# Patient Record
Sex: FEMALE | Race: WHITE
[De-identification: names, ages, dates, MRNs, and addresses within clinical notes are randomized per-mention and may not be internally consistent; named-entity substitution may affect disease eponyms.]

---

## 2019-01-17 ENCOUNTER — TRANSCRIPTION ENCOUNTER (OUTPATIENT)
Age: 49
End: 2019-01-17

## 2019-06-21 ENCOUNTER — TRANSCRIPTION ENCOUNTER (OUTPATIENT)
Age: 49
End: 2019-06-21

## 2020-02-19 ENCOUNTER — TRANSCRIPTION ENCOUNTER (OUTPATIENT)
Age: 50
End: 2020-02-19

## 2022-02-04 ENCOUNTER — APPOINTMENT (OUTPATIENT)
Dept: SPINE | Facility: CLINIC | Age: 52
End: 2022-02-04
Payer: COMMERCIAL

## 2022-02-04 ENCOUNTER — NON-APPOINTMENT (OUTPATIENT)
Age: 52
End: 2022-02-04

## 2022-02-04 VITALS
BODY MASS INDEX: 20.12 KG/M2 | DIASTOLIC BLOOD PRESSURE: 78 MMHG | HEIGHT: 63.5 IN | HEART RATE: 84 BPM | WEIGHT: 115 LBS | SYSTOLIC BLOOD PRESSURE: 132 MMHG | OXYGEN SATURATION: 98 %

## 2022-02-04 DIAGNOSIS — R26.89 OTHER ABNORMALITIES OF GAIT AND MOBILITY: ICD-10-CM

## 2022-02-04 DIAGNOSIS — Z82.49 FAMILY HISTORY OF ISCHEMIC HEART DISEASE AND OTHER DISEASES OF THE CIRCULATORY SYSTEM: ICD-10-CM

## 2022-02-04 DIAGNOSIS — M41.9 SCOLIOSIS, UNSPECIFIED: ICD-10-CM

## 2022-02-04 DIAGNOSIS — Z77.22 CONTACT WITH AND (SUSPECTED) EXPOSURE TO ENVIRONMENTAL TOBACCO SMOKE (ACUTE) (CHRONIC): ICD-10-CM

## 2022-02-04 DIAGNOSIS — Z80.41 FAMILY HISTORY OF MALIGNANT NEOPLASM OF OVARY: ICD-10-CM

## 2022-02-04 DIAGNOSIS — Z82.3 FAMILY HISTORY OF STROKE: ICD-10-CM

## 2022-02-04 DIAGNOSIS — Z83.3 FAMILY HISTORY OF DIABETES MELLITUS: ICD-10-CM

## 2022-02-04 PROBLEM — Z00.00 ENCOUNTER FOR PREVENTIVE HEALTH EXAMINATION: Status: ACTIVE | Noted: 2022-02-04

## 2022-02-04 PROCEDURE — 99203 OFFICE O/P NEW LOW 30 MIN: CPT

## 2022-02-05 PROBLEM — R26.89 BALANCE PROBLEM: Status: ACTIVE | Noted: 2022-02-04

## 2022-02-05 PROBLEM — M41.9 SCOLIOSIS OF THORACOLUMBAR SPINE: Status: ACTIVE | Noted: 2022-02-04

## 2022-02-05 NOTE — CONSULT LETTER
[Dear  ___] : Dear  [unfilled], [Courtesy Letter:] : I had the pleasure of seeing your patient, [unfilled], in my office today. [Please see my note below.] : Please see my note below. [Referral Closing:] : Thank you very much for seeing this patient.  If you have any questions, please do not hesitate to contact me. [Sincerely,] : Sincerely, [FreeTextEntry2] : Dr. Omaira Pandey [FreeTextEntry3] : José Simeon MS, ANP-BC, SCRN\par Board Certified Adult Nurse Practitioner on Behalf of Dr. Danica Willoughby\par  Neurosurgeon\par Department Neurosurgery\par \par

## 2022-02-05 NOTE — PHYSICAL EXAM
[Oriented To Time, Place, And Person] : oriented to person, place, and time [Cranial Nerves Optic (II)] : visual acuity intact bilaterally,  pupils equal round and reactive to light [Cranial Nerves Oculomotor (III)] : extraocular motion intact [Cranial Nerves Trigeminal (V)] : facial sensation intact symmetrically [Cranial Nerves Facial (VII)] : face symmetrical [Cranial Nerves Vestibulocochlear (VIII)] : hearing was intact bilaterally [Cranial Nerves Glossopharyngeal (IX)] : tongue and palate midline [Cranial Nerves Accessory (XI - Cranial And Spinal)] : head turning and shoulder shrug symmetric [Cranial Nerves Hypoglossal (XII)] : there was no tongue deviation with protrusion [4] : L3 quadriceps 4/5 [5] : S1 toe walking 5/5 [3+] : Ankle jerk left 3+ [___] : [unfilled] ~Ubeats present on the right [___] : [unfilled] ~Ubeats present on the left [Deformity] : deformity [Normal] : normal [Able to toe walk] : the patient was able to toe walk [Able to heel walk] : the patient was able to heel walk [Outer Ear] : the ears and nose were normal in appearance [Neck Appearance] : the appearance of the neck was normal [] : no respiratory distress [Heart Rate And Rhythm] : heart rate was normal and rhythm regular [Abnormal Walk] : normal gait [Baker] : Baker's sign was not demonstrated [Straight-Leg Raise Test - Left] : straight leg raise of the left leg was negative [Straight-Leg Raise Test - Right] : straight leg raise  of the right leg was negative

## 2022-02-05 NOTE — ASSESSMENT
[FreeTextEntry1] : Ms. Ana Maria Read is a 51 year old woman presenting with Thoracolumbar Scoliosis and new onset of intermittent balance difficulty. Despite hyperreflexia and mild weakness in Quadriceps she does not want to proceed with Complete spine MR to assess compression deformity. She agrees to a scoliosis Xray. I explained that the comprehensive workup would establish a baseline and monitor  her spinal curvature over time.   Scoliosis Xray to assess sagittal balance. She will begin a regimen of special Scoliosis focused exercise regimen including a Schroth exercises to help halt curvature progression. She will follow up to see Dr. Willoughby in 4 weeks.

## 2022-02-05 NOTE — HISTORY OF PRESENT ILLNESS
[< 3 months] : less than 3 months [FreeTextEntry1] : Balance difficulty x 2 weeks; Scoliosis [de-identified] : Ms. Ana Maria Read is 51 Year old woman presenting with no significant past medical history except for stress fracture in her right hip who presents today for evaluation of her scoliosis curvature and new onset of balance difficulty.  She states that in 1994 she fell off of a ladder at home but endured minor injuries to her right hip.  She states that her injuries did not warrant her going to the emergency room and she recovered conservatively.  Since that time she has been having hip related issues and just feeling off balance because of her childhood history of thoracic lumbar scoliosis.  Over the past 2 weeks ago she noticed that if she is standing for any prolonged.  With any type of health-related housework she would find herself veering off and losing balance for short period.  She denies any recent traumatic falls reports that she maintains an active lifestyle.  She states that she would like for her scoliosis to be evaluated and perhaps get recommendation for exercise.  She is not interested in any type of surgical intervention.  She does report that she has a history of stress fracture in her right hip and has never had a dedicated bone density study.\par \par She currently works for the  [employer] and does not smoke or drink alcohol.  She is functionally independent.\par On exam she is able to walk heel-to-toe without difficulty but have some mild hip flexion weakness and hyperreflexia in upper and lower extremities.\par

## 2022-02-24 ENCOUNTER — OUTPATIENT (OUTPATIENT)
Dept: OUTPATIENT SERVICES | Facility: HOSPITAL | Age: 52
LOS: 1 days | End: 2022-02-24
Payer: COMMERCIAL

## 2022-02-24 ENCOUNTER — APPOINTMENT (OUTPATIENT)
Dept: RADIOLOGY | Facility: CLINIC | Age: 52
End: 2022-02-24
Payer: COMMERCIAL

## 2022-02-24 DIAGNOSIS — M41.9 SCOLIOSIS, UNSPECIFIED: ICD-10-CM

## 2022-02-24 PROCEDURE — 72082 X-RAY EXAM ENTIRE SPI 2/3 VW: CPT

## 2022-02-24 PROCEDURE — 72082 X-RAY EXAM ENTIRE SPI 2/3 VW: CPT | Mod: 26

## 2022-08-06 ENCOUNTER — NON-APPOINTMENT (OUTPATIENT)
Age: 52
End: 2022-08-06

## 2022-09-01 ENCOUNTER — NON-APPOINTMENT (OUTPATIENT)
Age: 52
End: 2022-09-01

## 2024-01-08 ENCOUNTER — APPOINTMENT (OUTPATIENT)
Dept: UROGYNECOLOGY | Facility: CLINIC | Age: 54
End: 2024-01-08
Payer: COMMERCIAL

## 2024-01-08 VITALS
BODY MASS INDEX: 20.73 KG/M2 | HEART RATE: 95 BPM | DIASTOLIC BLOOD PRESSURE: 85 MMHG | WEIGHT: 117 LBS | SYSTOLIC BLOOD PRESSURE: 138 MMHG | HEIGHT: 63 IN

## 2024-01-08 PROCEDURE — 99204 OFFICE O/P NEW MOD 45 MIN: CPT

## 2024-01-08 RX ORDER — CLOBETASOL PROPIONATE 0.5 MG/G
0.05 CREAM TOPICAL
Qty: 1 | Refills: 3 | Status: ACTIVE | COMMUNITY
Start: 2024-01-08 | End: 1900-01-01

## 2024-01-08 RX ORDER — ESTRADIOL 0.1 MG/G
0.1 CREAM VAGINAL
Qty: 1 | Refills: 3 | Status: ACTIVE | COMMUNITY
Start: 2024-01-08 | End: 1900-01-01

## 2024-01-08 NOTE — HISTORY OF PRESENT ILLNESS
[FreeTextEntry1] :  Ana Maria presents for consultation with a h/o LS. She reports LS diagnosed at GYN visit. She denies any vulvar ulcerations or lesions. She was given Rx for clobetasol which she used twice daily x 3 months. She was then changed to once daily and has been on this for 2 months (total of 5 months). She reports improvement in symptoms, continues to feel clobetasol helping her. She also reports vaginal dryness and irritation. She is not on any treatment. She denies urinary incontinence, prolapse. She denies vaginal bleeding.

## 2024-01-08 NOTE — PHYSICAL EXAM
[Chaperone Present] : A chaperone was present in the examining room during all aspects of the physical examination [FreeTextEntry1] : General: Well, appearing. Alert and orientated. No acute distress HEENT: Normocephalic, atraumatic and extraocular muscles appear to be intact  Neck: Full range of motion, no obvious lymphadenopathy, deformities, or masses noted  Respiratory: Speaking in full sentences comfortably, normal work of breathing and no cough during visit Musculoskeletal: active full range of motion in extremities  Extremities: No upper extremity edema noted Skin: no obvious rash or skin lesions Neuro: Orientated X 3, speech is fluent, normal rate Psych: Normal mood and affect    [Tenderness] : ~T no ~M abdominal tenderness observed [Distended] : not distended [Vulvar Atrophy] : vulvar atrophy [Atrophy] : atrophy [Dry Mucosa] : dry mucosa [Normal] : no abnormalities [de-identified] : No ulcerations or lesions [de-identified] : No ulcerations or lesions; lichenification present. No lesions.  [FreeTextEntry4] : severe atrophy [de-identified] : no prolapse

## 2024-01-08 NOTE — DISCUSSION/SUMMARY
[FreeTextEntry1] :  -Counseling regarding chronicity of LS provided. We reviewed risks including SCC. We reviewed concerning clinical signs including lesions and ulcerations. We discussed need for long-term clinical follow up. We discussed need for biopsy if concerning symptoms.   -Will start to taper dose of clobetasol to twice weekly for maintenance -Treat severe vaginal atrophy with low dose vaginal estrogen. r/b/a reviewed. Instructions for use reviewed -RTO in 3 months for follow up, or sooner if issues arise. Will also f/u with GYN. She is looking for new provider as her GYN retired. Contact information for local GYN provided.   The following treatment plan was designed for this patient and provided to her in written form and reviewed extensively. Patient was given a copy to take home:   Start low dose vaginal estrogen: Estradiol vaginal cream. It comes in an applicator that is inserted into the vagina at bedtime. Fill up the applicator with estrogen cream to the 1 gram line. While lying in bed, gently insert the pre-filled applicator into the vagina ~2 insides inside the vagina. Hit the button to release the cream into the vagina. Let the cream sit inside the vagina overnight where it will absorb.   Directions: Use every night for 2 weeks (loading dose), then decrease to twice weekly at bedtime (Mon/Thursday). When you refill medication, only do twice weekly.   If not covered by insurance, call insurance company and get names of vaginal estrogen formulations that are covered. Call my office Mon-Fri with names of medication and we will switch to a form that's covered

## 2024-01-11 DIAGNOSIS — Z87.19 PERSONAL HISTORY OF OTHER DISEASES OF THE DIGESTIVE SYSTEM: ICD-10-CM

## 2024-01-11 RX ORDER — LORATADINE 5 MG
TABLET,CHEWABLE ORAL
Refills: 0 | Status: ACTIVE | COMMUNITY

## 2024-01-11 RX ORDER — TRAZODONE HYDROCHLORIDE 50 MG/1
50 TABLET ORAL
Refills: 0 | Status: ACTIVE | COMMUNITY

## 2024-01-18 DIAGNOSIS — Z78.9 OTHER SPECIFIED HEALTH STATUS: ICD-10-CM

## 2024-01-18 DIAGNOSIS — Z82.49 FAMILY HISTORY OF ISCHEMIC HEART DISEASE AND OTHER DISEASES OF THE CIRCULATORY SYSTEM: ICD-10-CM

## 2024-01-18 DIAGNOSIS — Z83.49 FAMILY HISTORY OF OTHER ENDOCRINE, NUTRITIONAL AND METABOLIC DISEASES: ICD-10-CM

## 2024-01-18 DIAGNOSIS — Z80.9 FAMILY HISTORY OF MALIGNANT NEOPLASM, UNSPECIFIED: ICD-10-CM

## 2024-01-18 DIAGNOSIS — Z63.5 DISRUPTION OF FAMILY BY SEPARATION AND DIVORCE: ICD-10-CM

## 2024-01-18 SDOH — SOCIAL STABILITY - SOCIAL INSECURITY: DISRUPTION OF FAMILY BY SEPARATION AND DIVORCE: Z63.5

## 2024-01-23 ENCOUNTER — NON-APPOINTMENT (OUTPATIENT)
Age: 54
End: 2024-01-23

## 2024-04-08 ENCOUNTER — APPOINTMENT (OUTPATIENT)
Dept: UROGYNECOLOGY | Facility: CLINIC | Age: 54
End: 2024-04-08
Payer: COMMERCIAL

## 2024-04-08 VITALS
HEART RATE: 92 BPM | SYSTOLIC BLOOD PRESSURE: 131 MMHG | HEIGHT: 63 IN | DIASTOLIC BLOOD PRESSURE: 73 MMHG | OXYGEN SATURATION: 99 % | WEIGHT: 117 LBS | BODY MASS INDEX: 20.73 KG/M2

## 2024-04-08 DIAGNOSIS — N95.2 POSTMENOPAUSAL ATROPHIC VAGINITIS: ICD-10-CM

## 2024-04-08 DIAGNOSIS — L90.0 LICHEN SCLEROSUS ET ATROPHICUS: ICD-10-CM

## 2024-04-08 PROCEDURE — 99213 OFFICE O/P EST LOW 20 MIN: CPT

## 2024-04-08 RX ORDER — ESTRADIOL 0.1 MG/G
0.1 CREAM VAGINAL
Qty: 1 | Refills: 3 | Status: ACTIVE | COMMUNITY
Start: 2024-04-08 | End: 1900-01-01

## 2024-04-08 NOTE — DISCUSSION/SUMMARY
[FreeTextEntry1] : -Counseling regarding chronicity of LS provided. We reviewed risks including SCC. We reviewed concerning clinical signs including lesions and ulcerations. We discussed need for long-term clinical follow up. We discussed need for biopsy if concerning symptoms.   -Will continue clobetasol twice weekly for maintenance -Continue vaginal estrogen biw, Rx provided with refills -Follow up with GYN, has appt end of April -RTO as needed

## 2024-04-08 NOTE — HISTORY OF PRESENT ILLNESS
[FreeTextEntry1] : Ana Maria presents for follow up with a h/o LS. She reports LS diagnosed at GYN visit. She denies any vulvar ulcerations or lesions. She is currently on Clobetasol maintenance twice weekly. She has atrophy and is using vaginal estrogen twice weekly as well. She denies urinary incontinence, prolapse. She denies vaginal bleeding.

## 2024-04-08 NOTE — PHYSICAL EXAM
[Chaperone Present] : A chaperone was present in the examining room during all aspects of the physical examination [Vulvar Atrophy] : vulvar atrophy [Atrophy] : atrophy [Dry Mucosa] : dry mucosa [Normal] : no abnormalities [FreeTextEntry1] : General: Well, appearing. Alert and orientated. No acute distress HEENT: Normocephalic, atraumatic and extraocular muscles appear to be intact  Neck: Full range of motion, no obvious lymphadenopathy, deformities, or masses noted  Respiratory: Speaking in full sentences comfortably, normal work of breathing and no cough during visit Musculoskeletal: active full range of motion in extremities  Extremities: No upper extremity edema noted Skin: no obvious rash or skin lesions Neuro: Orientated X 3, speech is fluent, normal rate Psych: Normal mood and affect    [Tenderness] : ~T no ~M abdominal tenderness observed [Distended] : not distended [de-identified] : No ulcerations or lesions [de-identified] : No ulcerations or lesions; lichenification improved from last visit. No lesions.  [FreeTextEntry4] : atrophy improved from last visit [de-identified] : no prolapse

## 2024-05-15 ENCOUNTER — NON-APPOINTMENT (OUTPATIENT)
Age: 54
End: 2024-05-15

## 2024-08-08 ENCOUNTER — RESULT REVIEW (OUTPATIENT)
Age: 54
End: 2024-08-08

## 2024-10-11 ENCOUNTER — RX RENEWAL (OUTPATIENT)
Age: 54
End: 2024-10-11

## 2025-07-12 ENCOUNTER — NON-APPOINTMENT (OUTPATIENT)
Age: 55
End: 2025-07-12

## 2025-08-18 ENCOUNTER — RESULT REVIEW (OUTPATIENT)
Age: 55
End: 2025-08-18